# Patient Record
Sex: FEMALE | Race: WHITE
[De-identification: names, ages, dates, MRNs, and addresses within clinical notes are randomized per-mention and may not be internally consistent; named-entity substitution may affect disease eponyms.]

---

## 2022-07-12 ENCOUNTER — NURSE TRIAGE (OUTPATIENT)
Dept: OTHER | Facility: CLINIC | Age: 87
End: 2022-07-12

## 2022-07-12 NOTE — TELEPHONE ENCOUNTER
Reason for Disposition   MILD constipation    Protocols used: CONSTIPATION-ADULT-[place]    Subjective: Caller states \"Can I take a Fleet enema? \"     Current Symptoms: Constipation, had a very small hard BM today, history of constipation    Onset:  A few days    Associated Symptoms: constipation    Pain Severity: mild pain    Temperature: No fever    What has been tried: Miralax    LMP: NA Pregnant: NA    Recommended disposition: Home care advice    Care advice provided, patient verbalizes understanding; denies any other questions or concerns; instructed to call back for any new or worsening symptoms. Will try a fleet enema    This triage is a result of a call to [address] [place] [address]. Please do not respond to the triage nurse through this encounter. Any subsequent communication should be directly with the patient.